# Patient Record
Sex: FEMALE | Race: WHITE | HISPANIC OR LATINO | URBAN - METROPOLITAN AREA
[De-identification: names, ages, dates, MRNs, and addresses within clinical notes are randomized per-mention and may not be internally consistent; named-entity substitution may affect disease eponyms.]

---
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
## 2024-09-27 ENCOUNTER — OFFICE VISIT (OUTPATIENT)
Dept: MEDICAL GROUP | Facility: MEDICAL CENTER | Age: 24
End: 2024-09-27

## 2024-09-27 VITALS
OXYGEN SATURATION: 100 % | DIASTOLIC BLOOD PRESSURE: 64 MMHG | BODY MASS INDEX: 24.48 KG/M2 | WEIGHT: 143.4 LBS | HEIGHT: 64 IN | TEMPERATURE: 97.8 F | SYSTOLIC BLOOD PRESSURE: 102 MMHG | HEART RATE: 74 BPM

## 2024-09-27 DIAGNOSIS — R17 YELLOW EYES: ICD-10-CM

## 2024-09-27 DIAGNOSIS — R14.0 BLOATING: ICD-10-CM

## 2024-09-27 DIAGNOSIS — Z00.00 PREVENTATIVE HEALTH CARE: ICD-10-CM

## 2024-09-27 DIAGNOSIS — L50.9 HIVES: ICD-10-CM

## 2024-09-27 PROCEDURE — 3078F DIAST BP <80 MM HG: CPT | Performed by: PHYSICIAN ASSISTANT

## 2024-09-27 PROCEDURE — 99204 OFFICE O/P NEW MOD 45 MIN: CPT | Performed by: PHYSICIAN ASSISTANT

## 2024-09-27 PROCEDURE — 3074F SYST BP LT 130 MM HG: CPT | Performed by: PHYSICIAN ASSISTANT

## 2024-09-27 RX ORDER — OMEPRAZOLE 40 MG/1
40 CAPSULE, DELAYED RELEASE ORAL DAILY
Qty: 30 CAPSULE | Refills: 0 | Status: SHIPPED | OUTPATIENT
Start: 2024-09-27

## 2024-09-27 ASSESSMENT — PATIENT HEALTH QUESTIONNAIRE - PHQ9: CLINICAL INTERPRETATION OF PHQ2 SCORE: 0

## 2024-09-27 NOTE — PROGRESS NOTES
"Chief Complaint   Patient presents with    Establish Care     Bloating, random rashes, yellow michelle eyes       HPI  Chelsy Paulino is a 23 y.o. female here today for establishing care and  evaluation of multiple medical concerns.  History of Present Illness    She experiences daily bloating and stomach pain, which has been ongoing for about a year. She suspects it may be due to an unhealthy diet or irregular eating habits due to her work schedule. Her diet includes beans, eggs, cheese, pepper, cabbage, tomato sauce, and homemade vegetable soups. She lives with her family and has not traveled recently prior to the onset of these symptoms. She also experiences bloating during her menstrual cycle.  Her menstrual cycles are regular, occurring monthly, but are heavy, necessitating hourly pad changes.     Occasionally, she develops non-itchy bumps on her skin, which resolve spontaneously. These bumps sometimes cause mild itching. Her mom gets hives as well.       States her mom says her eyes sometimes look yellow. No yellow skin.                   Exam:  /64 (BP Location: Right arm, Patient Position: Sitting, BP Cuff Size: Adult)   Pulse 74   Temp 36.6 °C (97.8 °F) (Temporal)   Ht 1.626 m (5' 4\")   Wt 65 kg (143 lb 6.4 oz)   SpO2 100%       Constitutional: Alert, oriented in no acute distress.  Psych: Eye contact is good, speech goal directed, affect calm  Eyes: Conjunctiva non-injected, sclera non-icteric.  Lungs: Unlabored respiratory effort, clear to auscultation bilaterally with good excursion, no wheez or rhonci  CV: regular rate and rhythm. No lower extremity edema  Skin: no rash noted       A/P:    1. Preventative health care    - Comp Metabolic Panel; Future  - CBC WITH DIFFERENTIAL; Future  - Lipid Profile; Future  - TSH WITH REFLEX TO FT4; Future    2. Bloating  Patient does not drink alcohol smoke or use much caffeine.    discussed diet, avoiding spicy and acidic food, cruciferous vegetables  - " omeprazole (PRILOSEC) 40 MG delayed-release capsule; Take 1 Capsule by mouth every day.  Dispense: 30 Capsule; Refill: 0    3. Yellow eyes  Today eyes are nonicteric   will check CMP   looking for elevation in bilirubin, discussed possibility of Gilbert's syndrome.    4. Hive  No problem to me to discuss, most likely hives,   take Benadryl 25 mg nightly as needed.      F/U: 6 wksf for pap and bloating f/u

## 2024-10-26 DIAGNOSIS — R14.0 BLOATING: ICD-10-CM

## 2024-10-29 RX ORDER — OMEPRAZOLE 40 MG/1
40 CAPSULE, DELAYED RELEASE ORAL DAILY
Qty: 30 CAPSULE | Refills: 0 | Status: SHIPPED | OUTPATIENT
Start: 2024-10-29

## 2024-12-31 ENCOUNTER — APPOINTMENT (OUTPATIENT)
Dept: MEDICAL GROUP | Facility: MEDICAL CENTER | Age: 24
End: 2024-12-31